# Patient Record
Sex: FEMALE | Race: OTHER | HISPANIC OR LATINO | URBAN - METROPOLITAN AREA
[De-identification: names, ages, dates, MRNs, and addresses within clinical notes are randomized per-mention and may not be internally consistent; named-entity substitution may affect disease eponyms.]

---

## 2017-06-27 ENCOUNTER — EMERGENCY (EMERGENCY)
Facility: HOSPITAL | Age: 67
LOS: 0 days | Discharge: HOME | End: 2017-06-27

## 2017-06-27 DIAGNOSIS — J45.909 UNSPECIFIED ASTHMA, UNCOMPLICATED: ICD-10-CM

## 2017-06-27 DIAGNOSIS — R11.2 NAUSEA WITH VOMITING, UNSPECIFIED: ICD-10-CM

## 2017-06-27 DIAGNOSIS — Z98.890 OTHER SPECIFIED POSTPROCEDURAL STATES: ICD-10-CM

## 2017-06-27 DIAGNOSIS — K57.90 DIVERTICULOSIS OF INTESTINE, PART UNSPECIFIED, WITHOUT PERFORATION OR ABSCESS WITHOUT BLEEDING: ICD-10-CM

## 2017-06-27 DIAGNOSIS — K57.92 DIVERTICULITIS OF INTESTINE, PART UNSPECIFIED, WITHOUT PERFORATION OR ABSCESS WITHOUT BLEEDING: ICD-10-CM

## 2017-06-27 DIAGNOSIS — R10.30 LOWER ABDOMINAL PAIN, UNSPECIFIED: ICD-10-CM

## 2017-06-27 DIAGNOSIS — Z79.899 OTHER LONG TERM (CURRENT) DRUG THERAPY: ICD-10-CM

## 2017-06-27 DIAGNOSIS — K57.32 DIVERTICULITIS OF LARGE INTESTINE WITHOUT PERFORATION OR ABSCESS WITHOUT BLEEDING: ICD-10-CM

## 2017-06-27 DIAGNOSIS — Z88.2 ALLERGY STATUS TO SULFONAMIDES: ICD-10-CM

## 2017-06-27 DIAGNOSIS — Z98.51 TUBAL LIGATION STATUS: ICD-10-CM

## 2018-05-31 ENCOUNTER — EMERGENCY (EMERGENCY)
Facility: HOSPITAL | Age: 68
LOS: 0 days | Discharge: HOME | End: 2018-05-31
Attending: EMERGENCY MEDICINE | Admitting: EMERGENCY MEDICINE

## 2018-05-31 VITALS
RESPIRATION RATE: 20 BRPM | HEART RATE: 76 BPM | OXYGEN SATURATION: 98 % | SYSTOLIC BLOOD PRESSURE: 185 MMHG | TEMPERATURE: 97 F | DIASTOLIC BLOOD PRESSURE: 86 MMHG

## 2018-05-31 DIAGNOSIS — T78.40XA ALLERGY, UNSPECIFIED, INITIAL ENCOUNTER: ICD-10-CM

## 2018-05-31 DIAGNOSIS — Z88.2 ALLERGY STATUS TO SULFONAMIDES: ICD-10-CM

## 2018-05-31 DIAGNOSIS — Z79.52 LONG TERM (CURRENT) USE OF SYSTEMIC STEROIDS: ICD-10-CM

## 2018-05-31 DIAGNOSIS — T37.0X5A ADVERSE EFFECT OF SULFONAMIDES, INITIAL ENCOUNTER: ICD-10-CM

## 2018-05-31 RX ORDER — FAMOTIDINE 10 MG/ML
20 INJECTION INTRAVENOUS ONCE
Qty: 0 | Refills: 0 | Status: COMPLETED | OUTPATIENT
Start: 2018-05-31 | End: 2018-05-31

## 2018-05-31 RX ADMIN — FAMOTIDINE 20 MILLIGRAM(S): 10 INJECTION INTRAVENOUS at 05:12

## 2018-05-31 RX ADMIN — Medication 40 MILLIGRAM(S): at 05:12

## 2018-05-31 NOTE — ED PROVIDER NOTE - PHYSICAL EXAMINATION
Vital signs reviewed  GENERAL: Patient well appearing, NAD  ENT: MMM. No tongue swelling.  RESPIRATORY: Normal respiratory effort. CTA B/L. No wheezing, rales, rhonchi. Speaking in full clear sentences.  CARDIOVASCULAR: Regular rate and rhythm. Normal S1/S2. No murmurs, rubs or gallops.  ABDOMEN: Soft. Nondistended. Nontender. No guarding or rebound.  SKIN:  Warm and dry. No acute rash.  PSYCHIATRIC: Cooperative. Affect appropriate.

## 2018-05-31 NOTE — ED PROVIDER NOTE - NS ED ROS FT
Constitutional: No fever  ENMT:  No tongue swelling  Respiratory:  No SOB  GI:  No nausea, vomiting, diarrhea, abdominal pain.  Skin:  +rash  Endocrine: No history of thyroid disease or diabetes.  Except as documented in the HPI,  all other systems are negative.

## 2018-05-31 NOTE — ED PROVIDER NOTE - ATTENDING CONTRIBUTION TO CARE
Patient here for now resolved rash after accidentally taking sulfa medication which she is allergic to, when she meant to take motrin. Took benadryl at home and rash resolved.     Never had angioedema, wheezing, nausea and continues to have no angioedema, clear lungs and soft abdomen.     No indication for further observation and treatment, advised to throw this medication away.     Will return for new or recurrent sx.

## 2018-05-31 NOTE — ED ADULT TRIAGE NOTE - CHIEF COMPLAINT QUOTE
Pt came c/o generalized rash and itchiness after accidently taking Sulfa pill instead of Motrin, pt has allergy to Sulfa. Pt took Benadryl at 3 am.

## 2018-05-31 NOTE — ED PROVIDER NOTE - OBJECTIVE STATEMENT
69yo F with h/o sulfa allergy p/w allergic reaction. Pt meant to take motrin but accidentally took sulfa medication prescribed to her 1 year ago. Took sulfa at 1am, began to have rash, took benadryl at 2am. Rash has since resolved. Denies SOB, tongue/lip swelling, nausea, vomiting.

## 2018-05-31 NOTE — ED ADULT NURSE NOTE - OBJECTIVE STATEMENT
pt sts she took sulfa medication by mistake and she developed a rash and took benadryl but came to the ED to be checked.

## 2018-06-23 ENCOUNTER — EMERGENCY (EMERGENCY)
Facility: HOSPITAL | Age: 68
LOS: 0 days | Discharge: HOME | End: 2018-06-24
Attending: EMERGENCY MEDICINE

## 2018-06-23 VITALS
RESPIRATION RATE: 20 BRPM | DIASTOLIC BLOOD PRESSURE: 79 MMHG | HEART RATE: 89 BPM | OXYGEN SATURATION: 98 % | TEMPERATURE: 97 F | SYSTOLIC BLOOD PRESSURE: 170 MMHG

## 2018-06-23 DIAGNOSIS — R51 HEADACHE: ICD-10-CM

## 2018-06-23 DIAGNOSIS — R20.0 ANESTHESIA OF SKIN: ICD-10-CM

## 2018-06-23 DIAGNOSIS — G45.9 TRANSIENT CEREBRAL ISCHEMIC ATTACK, UNSPECIFIED: ICD-10-CM

## 2018-06-23 DIAGNOSIS — R10.9 UNSPECIFIED ABDOMINAL PAIN: ICD-10-CM

## 2018-06-23 DIAGNOSIS — M54.9 DORSALGIA, UNSPECIFIED: ICD-10-CM

## 2018-06-23 LAB
ALBUMIN SERPL ELPH-MCNC: 3.9 G/DL — SIGNIFICANT CHANGE UP (ref 3.5–5.2)
ALP SERPL-CCNC: 62 U/L — SIGNIFICANT CHANGE UP (ref 30–115)
ALT FLD-CCNC: 11 U/L — SIGNIFICANT CHANGE UP (ref 0–41)
ANION GAP SERPL CALC-SCNC: 14 MMOL/L — SIGNIFICANT CHANGE UP (ref 7–14)
APPEARANCE UR: CLEAR — SIGNIFICANT CHANGE UP
AST SERPL-CCNC: 16 U/L — SIGNIFICANT CHANGE UP (ref 0–41)
BASOPHILS # BLD AUTO: 0.06 K/UL — SIGNIFICANT CHANGE UP (ref 0–0.2)
BASOPHILS NFR BLD AUTO: 0.7 % — SIGNIFICANT CHANGE UP (ref 0–1)
BILIRUB DIRECT SERPL-MCNC: <0.2 MG/DL — SIGNIFICANT CHANGE UP (ref 0–0.2)
BILIRUB INDIRECT FLD-MCNC: >0.1 MG/DL — LOW (ref 0.2–1.2)
BILIRUB SERPL-MCNC: 0.3 MG/DL — SIGNIFICANT CHANGE UP (ref 0.2–1.2)
BILIRUB UR-MCNC: NEGATIVE — SIGNIFICANT CHANGE UP
BUN SERPL-MCNC: 13 MG/DL — SIGNIFICANT CHANGE UP (ref 10–20)
CALCIUM SERPL-MCNC: 9.1 MG/DL — SIGNIFICANT CHANGE UP (ref 8.5–10.1)
CHLORIDE SERPL-SCNC: 103 MMOL/L — SIGNIFICANT CHANGE UP (ref 98–110)
CO2 SERPL-SCNC: 26 MMOL/L — SIGNIFICANT CHANGE UP (ref 17–32)
COLOR SPEC: YELLOW — SIGNIFICANT CHANGE UP
CREAT SERPL-MCNC: 0.8 MG/DL — SIGNIFICANT CHANGE UP (ref 0.7–1.5)
DIFF PNL FLD: NEGATIVE — SIGNIFICANT CHANGE UP
EOSINOPHIL # BLD AUTO: 0.15 K/UL — SIGNIFICANT CHANGE UP (ref 0–0.7)
EOSINOPHIL NFR BLD AUTO: 1.6 % — SIGNIFICANT CHANGE UP (ref 0–8)
ERYTHROCYTE [SEDIMENTATION RATE] IN BLOOD: 20 MM/HR — SIGNIFICANT CHANGE UP (ref 0–20)
GLUCOSE SERPL-MCNC: 111 MG/DL — HIGH (ref 70–99)
GLUCOSE UR QL: NEGATIVE MG/DL — SIGNIFICANT CHANGE UP
HCT VFR BLD CALC: 36.6 % — LOW (ref 37–47)
HGB BLD-MCNC: 11.4 G/DL — LOW (ref 12–16)
IMM GRANULOCYTES NFR BLD AUTO: 0.2 % — SIGNIFICANT CHANGE UP (ref 0.1–0.3)
KETONES UR-MCNC: NEGATIVE — SIGNIFICANT CHANGE UP
LEUKOCYTE ESTERASE UR-ACNC: NEGATIVE — SIGNIFICANT CHANGE UP
LIDOCAIN IGE QN: 28 U/L — SIGNIFICANT CHANGE UP (ref 7–60)
LYMPHOCYTES # BLD AUTO: 2.34 K/UL — SIGNIFICANT CHANGE UP (ref 1.2–3.4)
LYMPHOCYTES # BLD AUTO: 25.5 % — SIGNIFICANT CHANGE UP (ref 20.5–51.1)
MCHC RBC-ENTMCNC: 22.9 PG — LOW (ref 27–31)
MCHC RBC-ENTMCNC: 31.1 G/DL — LOW (ref 32–37)
MCV RBC AUTO: 73.5 FL — LOW (ref 81–99)
MONOCYTES # BLD AUTO: 0.72 K/UL — HIGH (ref 0.1–0.6)
MONOCYTES NFR BLD AUTO: 7.8 % — SIGNIFICANT CHANGE UP (ref 1.7–9.3)
NEUTROPHILS # BLD AUTO: 5.89 K/UL — SIGNIFICANT CHANGE UP (ref 1.4–6.5)
NEUTROPHILS NFR BLD AUTO: 64.2 % — SIGNIFICANT CHANGE UP (ref 42.2–75.2)
NITRITE UR-MCNC: NEGATIVE — SIGNIFICANT CHANGE UP
NRBC # BLD: 0 /100 WBCS — SIGNIFICANT CHANGE UP (ref 0–0)
PH UR: 6 — SIGNIFICANT CHANGE UP (ref 5–8)
PLATELET # BLD AUTO: 308 K/UL — SIGNIFICANT CHANGE UP (ref 130–400)
POTASSIUM SERPL-MCNC: 3.6 MMOL/L — SIGNIFICANT CHANGE UP (ref 3.5–5)
POTASSIUM SERPL-SCNC: 3.6 MMOL/L — SIGNIFICANT CHANGE UP (ref 3.5–5)
PROT SERPL-MCNC: 7.6 G/DL — SIGNIFICANT CHANGE UP (ref 6–8)
PROT UR-MCNC: (no result) MG/DL
RBC # BLD: 4.98 M/UL — SIGNIFICANT CHANGE UP (ref 4.2–5.4)
RBC # FLD: 15.8 % — HIGH (ref 11.5–14.5)
SODIUM SERPL-SCNC: 143 MMOL/L — SIGNIFICANT CHANGE UP (ref 135–146)
SP GR SPEC: 1.01 — SIGNIFICANT CHANGE UP (ref 1.01–1.03)
TROPONIN T SERPL-MCNC: <0.01 NG/ML — SIGNIFICANT CHANGE UP
UROBILINOGEN FLD QL: 0.2 MG/DL — SIGNIFICANT CHANGE UP (ref 0.2–0.2)
WBC # BLD: 9.18 K/UL — SIGNIFICANT CHANGE UP (ref 4.8–10.8)
WBC # FLD AUTO: 9.18 K/UL — SIGNIFICANT CHANGE UP (ref 4.8–10.8)

## 2018-06-23 RX ORDER — MECLIZINE HCL 12.5 MG
1 TABLET ORAL
Qty: 0 | Refills: 0 | COMMUNITY

## 2018-06-23 RX ORDER — ALBUTEROL 90 UG/1
2 AEROSOL, METERED ORAL
Qty: 0 | Refills: 0 | COMMUNITY

## 2018-06-23 NOTE — CONSULT NOTE ADULT - SUBJECTIVE AND OBJECTIVE BOX
CC: Headache      HPI: 69 yo right handed female with h/o vertigo , asthma and chronic headaches which is well controlled by OTC pain medications p.w 4 day h/o bifrontal pulsating headache and left facial numbness. Patient states that the symptoms started Wednesday and was a/w left neck pain and numbness of the V3 division of the left face. Reports that headache was a/w with visual floaters and was relieved with OTC pain meds. She got concerned when the facial numbness started and came to ed to get evaluated. Denies nausea, vomiting, dizziness, speech deficits, vision loss, focal weakness.    Home medications  -meclizine 12.5 mg q hs  -Atrovent inhaler    Social history  -denies smoking alcohol or drugs    Family history  -denies significant family history      Neuro Exam:  Orientation: oriented to person, place time and situation, speech and language intact  Cranial Nerves:  visual fields full to confrontation, pupils equal round and reactive to light, extraocular motion intact, decreased sensation to pp on the v3 division of the left face, face symmetrical, hearing was intact bilaterally, tongue and palate midline, head turning and shoulder shrug symmetric and there was no tongue deviation with protrusion.   Motor: 5/5 throughout/ no drift             Normal muscle tone and bulk             No abnormal involuntary movements  Sensory exam: intact except decreased pp to the v3 division of the left face.  Coordination:. no dysmetria or limb ataxia  Deep tendon reflexes: 2+/4  Plantar responses normal on the right, normal on the left.      NIHSS: 1    Allergies    sulfa drugs (Hives)    Intolerances      MEDICATIONS  (STANDING):    MEDICATIONS  (PRN):      LABS:                        11.4   9.18  )-----------( 308      ( 23 Jun 2018 19:02 )             36.6     06-23    143  |  103  |  13  ----------------------------<  111<H>  3.6   |  26  |  0.8    Ca    9.1      23 Jun 2018 19:02    TPro  7.6  /  Alb  3.9  /  TBili  0.3  /  DBili  <0.2  /  AST  16  /  ALT  11  /  AlkPhos  62  06-23            Neuro Imaging:  < from: CT Head No Cont (06.23.18 @ 18:29) >  findings:    The ventricular system, basal cisterns and cortical sulcal pattern are   within normal limits for the patient's stated age.    There is no acute intracranial hemorrhage, mass-effect or midline shift.    There is no extra-axial collection.    The visualized paranasal sinuses and mastoid complexes are grossly   unremarkable.  There is no displaced skull fracture.      Impression:    No CT evidence of acute intracranial injury.    If there is high clinical suspicion for an acute infarct, consider MRI   with stroke protocol.        < end of copied text >      EEG:     Echo:   Carotid Doppler: N/A  Telemetry: CC: Headache    HPI: 69 yo right handed female with h/o vertigo , asthma and chronic headaches which is well controlled by OTC pain medications p.w 4 day h/o bifrontal pulsating headache and left facial numbness. Patient states that the symptoms started Wednesday and was a/w left neck pain and numbness of the V3 division of the left face. Reports that headache was a/w with visual floaters and was relieved with OTC pain meds. She got concerned when the facial numbness started and came to ed to get evaluated. Denies nausea, vomiting, dizziness, speech deficits, vision loss, focal weakness.    Home medications  -meclizine 12.5 mg q hs  -Atrovent inhaler    Social history  -denies smoking alcohol or drugs    Family history  -denies significant family history      Neuro Exam:  Orientation: oriented to person, place time and situation, speech and language intact  Cranial Nerves:  visual fields full to confrontation, pupils equal round and reactive to light, extraocular motion intact, decreased sensation to pp on the v3 division of the left face, face symmetrical, hearing was intact bilaterally, tongue and palate midline, head turning and shoulder shrug symmetric and there was no tongue deviation with protrusion.   Motor: 5/5 throughout/ no drift             Normal muscle tone and bulk             No abnormal involuntary movements  Sensory exam: intact except decreased pp to the v3 division of the left face.  Coordination:. no dysmetria or limb ataxia  Deep tendon reflexes: 2+/4  Plantar responses normal on the right, normal on the left.      NIHSS: 1    Allergies    sulfa drugs (Hives)    Intolerances      MEDICATIONS  (STANDING):    MEDICATIONS  (PRN):      LABS:                        11.4   9.18  )-----------( 308      ( 23 Jun 2018 19:02 )             36.6     06-23    143  |  103  |  13  ----------------------------<  111<H>  3.6   |  26  |  0.8    Ca    9.1      23 Jun 2018 19:02    TPro  7.6  /  Alb  3.9  /  TBili  0.3  /  DBili  <0.2  /  AST  16  /  ALT  11  /  AlkPhos  62  06-23            Neuro Imaging:  < from: CT Head No Cont (06.23.18 @ 18:29) >  findings:    The ventricular system, basal cisterns and cortical sulcal pattern are   within normal limits for the patient's stated age.    There is no acute intracranial hemorrhage, mass-effect or midline shift.    There is no extra-axial collection.    The visualized paranasal sinuses and mastoid complexes are grossly   unremarkable.  There is no displaced skull fracture.      Impression:    No CT evidence of acute intracranial injury.    If there is high clinical suspicion for an acute infarct, consider MRI   with stroke protocol.        < end of copied text >      EEG:     Echo:   Carotid Doppler: N/A  Telemetry:

## 2018-06-23 NOTE — ED PROVIDER NOTE - OBJECTIVE STATEMENT
patient states that 4 days ago started having intermittent episodes of headache, moderate pain, associated with Lt sided neck pain and Lt lower face numbness, also had discomfort in LUE/LLE, patient states that had one episode of diarrhea with abd pain 4 days ago, which had resolved. Denies any cp/sob/palpitations, denies any trauma, no f/c/rash. Denies any eye pain/vision changes.   patient denies any headache in the ED, patient is c/o Lt lower facial numbness only. Denies any trauma, no head/neck injury.

## 2018-06-23 NOTE — ED CDU PROVIDER INITIAL DAY NOTE - NEURO NEGATIVE STATEMENT, MLM
no loss of consciousness, no gait abnormality, + frontal headache, no sensory deficits, and no weakness.

## 2018-06-23 NOTE — ED ADULT NURSE NOTE - OBJECTIVE STATEMENT
patient presents to ED for evaluation of intermittent generalized abdominal pain which began on Thursday with one episode of diarrhea.

## 2018-06-23 NOTE — ED CDU PROVIDER INITIAL DAY NOTE - ENMT NEGATIVE STATEMENT, MLM
Ears: no ear pain and no hearing problems.Nose: no nasal congestion and no nasal drainage.Mouth/Throat: no dysphagia, no hoarseness and no throat pain.Neck: no lumps, + left sided neck pain, no stiffness and no swollen glands

## 2018-06-23 NOTE — ED CDU PROVIDER INITIAL DAY NOTE - OBJECTIVE STATEMENT
69y/o female with pmh of asthma and vertigo, pt. c/o lower back pain which radiated to left leg. pt. also c/o frontal ha with left neck pain x 3 days. pt. has a hx of headaches but was never evaluated for it. pt. states that this ha is no different from previous episodes. malone is usually relieved with tylenol or motrin. denies slurred speech, cp, sob, numbness, weakness. pt. was placed into obs for tia workup.

## 2018-06-23 NOTE — ED CDU PROVIDER INITIAL DAY NOTE - ATTENDING CONTRIBUTION TO CARE
Pt presents for upper abdominal pain and left back pain. Pain radiates down the left leg. Also pain to the left side of the neck. No nausea or vomiting. Pt also notes she had brief numbness to the corner of the mouth on the left side. Hx of chronic headaches and chronic vertigo. Takes antivert daily at night. Takes advil and tylenol daily. On exam S1S2 rrr, lungs clear, abdomen-+ epigastric tenderness. + left flank area tenderness. no rash. Normal neuro. Will do Ct scan abdomen and pelvis.

## 2018-06-23 NOTE — CONSULT NOTE ADULT - ASSESSMENT
67 yo right handed female with h/o vertigo , asthma and chronic headaches which is well controlled by OTC pain medications p. w 4 day h/o bifrontal pulsating headache , left neck pain and  left facial numbness. Nihss 1    DDx:  cva/tia vs Migraines    Plan  -n/c mri brain  -mra h/n  -echo  -start asa 81mg  -check lipid profile and hbaic  -admit to tia obs unit 69 yo right handed female with h/o vertigo , asthma and chronic headaches which is well controlled by OTC pain medications p. w 4 day h/o bifrontal pulsating headache , left neck pain and  left facial numbness. Nihss 1    DDx:  cva/tia vs Migraines    Plan  -n/c mri brain  -mra h/n  -echo  -start asa 81mg  -check lipid profile and hbaic  -admit to tia obs unit  - if MRI/A (-), may d/c with outpt f/u

## 2018-06-23 NOTE — ED PROVIDER NOTE - PROGRESS NOTE DETAILS
neuro NP in ED evaluating patient. patient remained stable in ED, as recommended by neuro, is admitted to EDOU for TIA evaluation.

## 2018-06-24 VITALS
HEART RATE: 64 BPM | DIASTOLIC BLOOD PRESSURE: 70 MMHG | RESPIRATION RATE: 18 BRPM | TEMPERATURE: 98 F | SYSTOLIC BLOOD PRESSURE: 130 MMHG | OXYGEN SATURATION: 97 %

## 2018-06-24 NOTE — ED ADULT NURSE REASSESSMENT NOTE - NS ED NURSE REASSESS COMMENT FT1
Pt is resting with even and unlabored respirations, continuous cardiac monitoring in place, no  immediate concerns noted at this time, will continue to monitor and assess.

## 2018-06-24 NOTE — ED CDU PROVIDER DISPOSITION NOTE - CLINICAL COURSE
Pt presented with abdominal pain, back pain, headache and some facial numbness. Placed into observation for evaluation. While in observation pt was on continuous cardiac monitoring. Serial cardiac markers neg. Echo LVH. Ct scan neg except for tiny density of liver. Pt was advised. Glucose slightly elevated. Pt advised and told to lose 5 lbs. PT is leaving ama. Advised to follow up with her doctor, neuro and cardiology.

## 2018-06-24 NOTE — ED ADULT NURSE REASSESSMENT NOTE - NS ED NURSE REASSESS COMMENT FT1
Pt on continuous cardiac and pulse ox monitoring,  at this present time there are no complaints, will continue to monitor and assess.

## 2020-10-30 ENCOUNTER — EMERGENCY (EMERGENCY)
Facility: HOSPITAL | Age: 70
LOS: 0 days | Discharge: HOME | End: 2020-10-30
Attending: EMERGENCY MEDICINE | Admitting: EMERGENCY MEDICINE
Payer: COMMERCIAL

## 2020-10-30 VITALS
TEMPERATURE: 98 F | HEART RATE: 65 BPM | DIASTOLIC BLOOD PRESSURE: 85 MMHG | RESPIRATION RATE: 18 BRPM | SYSTOLIC BLOOD PRESSURE: 192 MMHG | OXYGEN SATURATION: 98 %

## 2020-10-30 DIAGNOSIS — M79.669 PAIN IN UNSPECIFIED LOWER LEG: ICD-10-CM

## 2020-10-30 DIAGNOSIS — M54.42 LUMBAGO WITH SCIATICA, LEFT SIDE: ICD-10-CM

## 2020-10-30 DIAGNOSIS — Z88.2 ALLERGY STATUS TO SULFONAMIDES: ICD-10-CM

## 2020-10-30 PROCEDURE — 93971 EXTREMITY STUDY: CPT | Mod: 26,LT

## 2020-10-30 PROCEDURE — 99284 EMERGENCY DEPT VISIT MOD MDM: CPT

## 2020-10-30 RX ORDER — IBUPROFEN 200 MG
1 TABLET ORAL
Qty: 21 | Refills: 0
Start: 2020-10-30 | End: 2020-11-05

## 2020-10-30 RX ORDER — METHOCARBAMOL 500 MG/1
1000 TABLET, FILM COATED ORAL ONCE
Refills: 0 | Status: COMPLETED | OUTPATIENT
Start: 2020-10-30 | End: 2020-10-30

## 2020-10-30 RX ORDER — IBUPROFEN 200 MG
600 TABLET ORAL ONCE
Refills: 0 | Status: COMPLETED | OUTPATIENT
Start: 2020-10-30 | End: 2020-10-30

## 2020-10-30 RX ORDER — METHOCARBAMOL 500 MG/1
2 TABLET, FILM COATED ORAL
Qty: 30 | Refills: 0
Start: 2020-10-30 | End: 2020-11-03

## 2020-10-30 RX ADMIN — METHOCARBAMOL 1000 MILLIGRAM(S): 500 TABLET, FILM COATED ORAL at 21:29

## 2020-10-30 RX ADMIN — Medication 600 MILLIGRAM(S): at 21:29

## 2020-10-30 NOTE — ED PROVIDER NOTE - PATIENT PORTAL LINK FT
You can access the FollowMyHealth Patient Portal offered by [facility] by registering at the following website: [email or web address]. By joining [facility]’s FollowMyHealth portal, you will also be able to view your health information using other applications (apps) compatible with our system.

## 2020-10-30 NOTE — ED PROVIDER NOTE - NS ED ROS FT
Constitutional: No fever, chills.  Eyes:  No visual changes  ENMT:  No neck pain  Cardiac:  No chest pain  Respiratory:  No cough, SOB  GI:  No nausea, vomiting, diarrhea, abdominal pain.  :  No dysuria, hematuria  MS:  (+)lower back pain. (+)left lower extremity pain  Neuro:  No headache or lightheadedness  Skin:  No skin rash  Endocrine: No history of thyroid disease or diabetes.  Except as documented in the HPI,  all other systems are negative.

## 2020-10-30 NOTE — ED PROVIDER NOTE - NSFOLLOWUPINSTRUCTIONS_ED_ALL_ED_FT
Sciatica    WHAT YOU NEED TO KNOW:    Sciatica is a condition that causes pain along your sciatic nerve. The sciatic nerve runs from your spine through both sides of your buttocks. It then runs down the back of your thigh, into your lower leg and foot. Your sciatic nerve may be compressed, inflamed, irritated, or stretched.          DISCHARGE INSTRUCTIONS:    Medicines:     NSAIDs: These medicines decrease swelling and pain. NSAIDs are available without a doctor's order. Ask your healthcare provider which medicine is right for you. Ask how much to take and when to take it. Take as directed. NSAIDs can cause stomach bleeding or kidney problems if not taken correctly.      Acetaminophen: This medicine decreases pain. Acetaminophen is available without a doctor's order. Ask how much to take and when to take it. Follow directions. Acetaminophen can cause liver damage if not taken correctly.      Muscle relaxers help decrease pain and muscle spasms.      Take your medicine as directed. Contact your healthcare provider if you think your medicine is not helping or if you have side effects. Tell him of her if you are allergic to any medicine. Keep a list of the medicines, vitamins, and herbs you take. Include the amounts, and when and why you take them. Bring the list or the pill bottles to follow-up visits. Carry your medicine list with you in case of an emergency.    Follow up with your healthcare provider as directed: Write down your questions so you remember to ask them during your visits.     Manage your symptoms:     Activity: Decrease your activity. Do not lift heavy objects or twist your back for at least 6 weeks. Slowly return to your usual activity.      Ice: Ice helps decrease swelling and pain. Ice may also help prevent tissue damage. Use an ice pack, or put crushed ice in a plastic bag. Cover it with a towel and place it on your low back or leg for 15 to 20 minutes every hour or as directed.      Heat: Heat helps decrease pain and muscle spasms. Apply heat on the area for 20 to 30 minutes every 2 hours for as many days as directed.       Physical therapy: You may need to see physical therapist to teach you exercises to help improve movement and strength, and to decrease pain. An occupational therapist teaches you skills to help with your daily activities.       Use assistive devices if directed: You may need to wear back support, such as a back brace. You may need crutches, a cane, or a walker to decrease stress on your lower back and leg muscles. Ask your healthcare provider for more information about assistive devices and how to use them correctly.    Self-care:     Avoid pressure on your back and legs: Do not lift heavy objects, or stand or sit for long periods of time.      Lift objects safely: Keep your back straight and bend your knees when you  an object. Do not bend or twist your back when you lift.      Maintain a healthy weight: Ask your healthcare provider how much you should weigh. Ask him to help you create a weight loss plan if you are overweight.       Exercise: Ask your healthcare provider about the best stretching, warmup, and exercise plan for you.     Contact your healthcare provider if:     You have pain in your lower back at night or when resting.      You have pain in your lower back with numbness below the knee.      You have weakness in one leg only.      You have questions or concerns about your condition or care.    Return to the emergency department if:     You have trouble holding back your urine or bowel movements.      You have weakness in both legs.      You have numbness in your groin or buttocks.         © Copyright [employer] 2019 All illustrations and images included in CareNotes are the copyrighted property of [employer] or [employer].

## 2020-10-30 NOTE — ED PROVIDER NOTE - PROGRESS NOTE DETAILS
I have personally evaluated the patient myself and agree with fellow's plan. Patient appears very well, states she is feeling better, tingling has almost resolved. as per vascular tech, prelim duplex study is negative Patient to be discharged from ED. Any available test results were discussed with patient and/or family. Verbal instructions given, including instructions to return to ED immediately for any new, worsening, or concerning symptoms. Patient endorsed understanding. Written discharge instructions additionally given, including follow-up plan.  Patient was given opportunity to ask questions.

## 2020-10-30 NOTE — ED PROVIDER NOTE - OBJECTIVE STATEMENT
69 yo F pmh sciatica, vertigo, asthma and diverticulitis sent in by [facility] to rule out DVT, c/o left sided lower extremity moderate, throbbing pain associated with numbness and tingling x4days. Pt reports the left lower extremity pain started at her toes and radiates up to the left lower back, worse with walking, better while sitting. Pt denies smoking, hx of DVT/PE. Pt able to ambulate unassisted. Pt denies any trauma to the area, fever, SOB, chest pain, abdominal pain, chills, urinary/bowel incontinence.

## 2020-10-30 NOTE — ED PROVIDER NOTE - ATTENDING CONTRIBUTION TO CARE
71 yo female PMH as noted c/.o non-traumatic left buttock pain radiating to her leg/foot for over a week. Pain is associated with mild tingling in her leg which is different from prior episode of sciatica on the same side,  She has been taking Motrin last week, but this week she only took 1 tab yesterday.  Denies any weakness, urinary or bowel problems, no abdominal pain, or any other complaints , no additional neurologic symptoms.  She denies any weight loss.  Her PMD is on vacations so she went to urgent care and was sent here.  She has an appointment with Dr Leggett on Tuesday.  Well-appearing elderly female, NAD, sitting on a chair, PERRL, nml work of breathing, lungs CTA b/l RRR., well-perfused extremities, distal pulses intact, abdomen soft, NT/ND, no midline spine ttp, + ttp of the left buttock., no calf ttp or unilateral leg swelling, FROM at all joints, nml dorsiflexion of the great toes B/l , no focal motor weakness, slightly decreased sensation to light touch of the left foot, gait slightly antalgic.  Imp: left sciatica, will treat pain, patient already has appropriate follow up.

## 2020-10-30 NOTE — ED PROVIDER NOTE - PHYSICAL EXAMINATION
CONST: Well appearing in NAD  NECK: Non-tender, no meningeal signs  CARD: Normal S1 S2; Normal rate and rhythm  RESP: Equal BS B/L, No wheezes, rhonchi or rales. No distress  GI: Soft, non-tender, non-distended.  MS: (+)bilateral paraspinal lumber tenderness, left sciatic notch tenderness. Normal ROM in all extremities. No midline spinal tenderness.  SKIN: Warm, dry, no acute rashes. Good turgor  NEURO: A&Ox3, No focal deficits. Strength 5/5 with no sensory deficits. Steady gait

## 2020-10-30 NOTE — ED PROVIDER NOTE - CLINICAL SUMMARY MEDICAL DECISION MAKING FREE TEXT BOX
Patient with left sciatica, appears very well, symptoms improved in ED, no red flags, has a follow up appointment with her PMD in 3 days, strict return precautions given.

## 2020-10-31 PROBLEM — K57.92 DIVERTICULITIS OF INTESTINE, PART UNSPECIFIED, WITHOUT PERFORATION OR ABSCESS WITHOUT BLEEDING: Chronic | Status: ACTIVE | Noted: 2018-06-23

## 2020-10-31 PROBLEM — R42 DIZZINESS AND GIDDINESS: Chronic | Status: ACTIVE | Noted: 2018-06-23

## 2021-03-09 ENCOUNTER — EMERGENCY (EMERGENCY)
Facility: HOSPITAL | Age: 71
LOS: 0 days | Discharge: HOME | End: 2021-03-09
Attending: EMERGENCY MEDICINE | Admitting: EMERGENCY MEDICINE
Payer: COMMERCIAL

## 2021-03-09 VITALS
HEART RATE: 85 BPM | TEMPERATURE: 100 F | DIASTOLIC BLOOD PRESSURE: 72 MMHG | SYSTOLIC BLOOD PRESSURE: 156 MMHG | RESPIRATION RATE: 18 BRPM | OXYGEN SATURATION: 100 %

## 2021-03-09 DIAGNOSIS — Z79.51 LONG TERM (CURRENT) USE OF INHALED STEROIDS: ICD-10-CM

## 2021-03-09 DIAGNOSIS — U07.1 COVID-19: ICD-10-CM

## 2021-03-09 DIAGNOSIS — Z88.2 ALLERGY STATUS TO SULFONAMIDES: ICD-10-CM

## 2021-03-09 DIAGNOSIS — R51.9 HEADACHE, UNSPECIFIED: ICD-10-CM

## 2021-03-09 DIAGNOSIS — J45.909 UNSPECIFIED ASTHMA, UNCOMPLICATED: ICD-10-CM

## 2021-03-09 PROCEDURE — 99284 EMERGENCY DEPT VISIT MOD MDM: CPT

## 2021-03-09 NOTE — ED ADULT NURSE NOTE - NS ED NURSE LEVEL OF CONSCIOUSNESS SPEECH
Patient:   TRAMAINE CABELLO            MRN: [id number]            FIN: [id number]              Age:   62 years     Sex:  FEMALE     :  [birth year]   Associated Diagnoses:   None   Author:   NEETU NICKERSON     Subjective   on milrinone  restarted on lasix drip     Objective   Intake and Output   I & O between:  2019 12:47 TO 2019 12:47  Med Dosing Weight:  83  kg   15-JUL-2019  24 Hour Intake:   5350.32  ( 64.46 mL/kg )  24 Hour Output:   3265.00           24 Hour Urine/Stool Output:   0.0  24 Hour Balance:   .32           24 Hour Urine Output:   3040.00  ( 1.53 mL/kg/hr )                    Stool Count:  1         Vitals between:   2019 12:47:31   TO   2019 12:47:31                   LAST RESULT MINIMUM MAXIMUM  Heart Rate 86 76 93  Respiratory Rate 17 15 32  A Line Systolic 77 70 102  A Line Diastolic 62 62 98  A Line Mean 66 66 95  CVP                     10 1 12  SpO2                    100 97 100  FiO2                    0.40 0.40 0.50      General:  No acute distress, intubated, sedated.    HENT:  Normocephalic, Oral mucosa is moist.    Respiratory:  coarse bs.    Cardiovascular:  pump sounds.    Gastrointestinal:  Soft, Non-distended.    Genitourinary:  sinclair.    Lymphatics:  trace LE edema.    Integumentary:  Warm, Dry.    Neurologic:  sedated.    Psychiatric:  sedated.      Results Review   Labs between:  2019 12:47 to 2019 12:47  CBC:                 WBC  HgB  Hct  Plt  MCV  RDW   2019 (H) 11.1  (L) 7.8  (L) 22.7  (L) 71  86.3  (H) 17.3   2019 (H) 11.5  (L) 8.5  (L) 25.2  (L) 68  87.2  (H) 17.3   2019 (H) 12.0  (L) 8.5  (L) 25.1  (L) 63  86.6  (H) 17.1   2019   (L) 8.5  (L) 25.2         DIFF:                 Seg  Neutroph//ABS  Lymph//ABS  Mono//ABS  EOS/ABS  2019 NOT APPLICABLE  89 // (H) 10.2  4 // (L) 0.5  4 // 0.5 2 // 0.2  BMP:                 Na  Cl  BUN  Glu   2019                                     K  CO2   Cr  Ca                              3.4         BMP:                 Na  Cl  BUN  Glu   28-JUL-2019 140  (H) 113  (H) 69  (H) 127                              K  CO2  Cr  Ca                              4.1  (L) 18  0.92  (L) 7.7   BMP:                 Na  Cl  BUN  Glu   27-JUL-2019                                     K  CO2  Cr  Ca                              (L) 3.2         BMP:                 Na  Cl  BUN  Glu   27-JUL-2019 144  (H) 117  (H) 67  (H) 111                              K  CO2  Cr  Ca                              4.1  (L) 20  0.93  (L) 7.5   CMP:                 AST  ALT  AlkPhos  Bili  Albumin   28-JUL-2019 (H) 50  37  85  (H) 2.8  (L) 2.5   Other Chem:             Mg  Phos  Triglycerides  GGTP  DirectBili                           1.8  2.9         POC GLU:                 Latest Result  Latest Date  Minimum  Min Date  Maximum  Max Date                             (H) 112  27-JUL-2019 (H) 112  27-JUL-2019 (H) 110  27-JUL-2019  COAG:                 INR  PT  PTT  Ddimer  Fibrinogen    28-JUL-2019 1.0  11.0  (H) 33       Blood Gas:            Ph  PCO2  PO2  BiCarb  BaseExcess   Arterial:  28-JUL-2019 7.40  32  (H) 224  (L) 20  NOT APPLICABLE                              Ionized Ca  Na  K  HgB  Lactic Acid                              1.24  138  (L) 3.3  (L) 8.9  0.8   28-JUL-2019 7.35  (L) 31  (H) 230  (L) 17  NOT APPLICABLE                              Ionized Ca  Na  K  HgB  Lactic Acid                              1.23  136  3.8  (L) 8.0  0.7   28-JUL-2019 7.36  33  (H) 232  (L) 19  NOT APPLICABLE                              Ionized Ca  Na  K  HgB  Lactic Acid                              1.22  136  3.4  (L) 7.7  0.8   27-JUL-2019 7.36  34  (H) 276  (L) 19  NOT APPLICABLE                              Ionized Ca  Na  K  HgB  Lactic Acid                              1.24  137  3.7  (L) 9.4  0.7   27-JUL-2019 7.38  (L) 31  (H) 279  (L) 18  NOT APPLICABLE                              Ionized Ca  Na  K   HgB  Lactic Acid                              1.19  137  4.0  (L) 10.9  0.7                  Result title:  XR CHEST 1V  Result status:  Final  Verified by:  CORNEL NEWTON on 07/28/2019 5:29  IMPRESSION:Stable exam.No acute findings.  Result title:  XR ABDOMEN 1V  Result status:  Final  Verified by:  CORNEL NEWTON on 07/28/2019 5:30  IMPRESSION: There is a nonspecific bowel gas pattern.  No free air or obstruction is seen.  No mass effect or suggestion of ascites. NG tube in stomach1.       Impression and Plan   1. INES: baseline cr not known at this time  INES secondary to ATN.   - CRRT started 7/19.  CRRT stopped 7/22  - BUN elevated due to diuresis and GI bleeding  - cr is good  2. cv shock post NSTEMI with multivessel dz.   complicated by mediastinal bleeding, emergent anterior thoracotomy  - restarted on lasix drip.  monitor CVP  3. Resp failure. reintubated.   4. hypokalemia: replace as needed  5. hypernatremia. na improving  - stop D5w  - decrease water flushes to 250ml q6 hours  6. anemia/ GI bleeding: colonoscopy with ischemic colitis  - transfuse as needed  - GI following  discussed with ICU RN   Speaking Coherently/Age appropriate

## 2021-03-09 NOTE — ED ADULT NURSE NOTE - OBJECTIVE STATEMENT
pt presents to ED requesting antibody infusion, c.o headache. Pt tested positive for covid x 1 week ago

## 2021-03-09 NOTE — ED PROVIDER NOTE - NS ED ROS FT
Constitutional: (+) fever, (+) fatigue  Eyes/ENT: (-) blurry vision, (-) epistaxis  Cardiovascular: (-) chest pain, (-) syncope  Respiratory: (-) cough, (-) shortness of breath  Gastrointestinal: (-) vomiting, (-) diarrhea  Musculoskeletal: (-) neck pain, (-) back pain, (-) joint pain  Integumentary: (-) rash, (-) edema  Neurological: (-) headache, (-) altered mental status  Psychiatric: (-) hallucinations  Allergic/Immunologic: (-) pruritus

## 2021-03-09 NOTE — ED PROVIDER NOTE - NSFOLLOWUPINSTRUCTIONS_ED_ALL_ED_FT
Follow up with PMD in 1-2 days.    Novel Coronavirus (COVID-19)  The Facts  What is a coronavirus?  Coronaviruses are a large family of viruses that cause illnesses ranging from the common cold  to more severe diseases such as Middle East Respiratory Syndrome (MERS) and Severe Acute  Respiratory Syndrome (SARS).  What is Novel Coronavirus (COVID-19)?  COVID-19 is a new strain of Coronavirus that has not been previously identified in humans. COVID-19  was identified in [place], [place], [place] in December 2019 (COVID-19). COVID-19 has  since been identified outside of [place], in a growing number of countries internationally, including  the [place].  Where can I find the most recent information about COVID-19?  The Centers for Disease Control and Prevention (CDC) is closely monitoring the outbreak caused by the  COVID-19. For the latest information about COVID- 19, visit the CDC website at  https://www.cdc.gov/coronavirus/index.html  How are coronaviruses spread?  Coronaviruses can be transmitted from person-to- person, usually after close contact with an infected person,  for example, in a household, workplace, or healthcare setting via droplets that become airborne after a cough  or sneeze by an affected person. These droplets can then infect a nearby person. It is likely transmission also  occurs by touching recently contaminated surfaces.  What are the symptoms of coronavirus infection?  It depends on the virus, but common signs include fever and/or respiratory symptoms such as  cough and shortness of breath. In more severe cases, infection can cause pneumonia, severe acute  respiratory syndrome, kidney failure and even death. Fortunately, most cases of COVID-19 have an  illness no different than the influenza “flu”. With a majority of these patients having mild symptoms  and overall mortality which appears to be not much different than the flu.  Is there a treatment for a COVID-19?  There is no specific treatment for disease caused by COVID-19. However, many of the symptoms can  be treated based on the patient’s clinical condition. Supportive care for infected persons can be highly  effective.  What can I do to protect myself?  Washing your hands, covering your cough, and disinfecting surfaces are the best precautionary  measures. It is also advisable to avoid close contact with anyone showing symptoms of respiratory  illness such as coughing and sneezing. Those with symptoms should wear a surgical mask when  around others.  What can I do to protect those around me?  If you have been identified as someone who may be infected with COVID-19, we recommend you  follow the self-isolation procedures outlined below to protect those around you and limit the spread  of this virus.   March 3, 2020  Recommendations for Patients Advised to Self-Isolate  for Possible COVID-19 Exposure  We recommend the below precautionary steps from now until 14 days from when you  returned from your travel or date of your last known possible contact:  - Do not go to work, school, or public areas. Avoid using public transportation, ride-sharing, or  taxis.  - As much as possible, separate yourself from other people in your home. If you can, you should  stay in a room and away from other people in your home. Also, you should use a separate  bathroom, if available.  - Wear the supplied mask whenever you are around other people.  - If you have a non-urgent medical appointment, please reschedule for a later date. If the  appointment is urgent, please call the healthcare provider and tell them that you are on selfisolation for possible COVID-19. This will help the healthcare provider’s office take steps to keep  other people from getting infected or exposed. If you can reschedule routine appointments, do  so.  - Wash your hands often with soap and water for at least 15 to 20 seconds or clean your hands  with an alcohol-based hand  that contains 60 to 95% alcohol, covering all surfaces of  your hands and rubbing them together until they feel dry. Soap and water should be used  preferentially if hands are visibly dirty.  - Cover your mouth and nose with a tissue when you cough or sneeze. Throw used tissues in a  lined trash can; immediately wash your hands.  - Avoid touching your eyes, nose, and mouth with your hands.  - Avoid sharing personal household items. You should not share dishes, drinking glasses, cups,  eating utensils, towels, or bedding with other people or pets in your home. After using these  items, they should be washed thoroughly with soap and water.  - Clean and disinfect all “high-touch” surfaces every day. High touch surfaces include counters,  tabletops, doorknobs, light switches, remote controls, bathroom fixtures, toilets, phones,  keyboards, tablets, and bedside tables. Also, clean any surfaces that may have blood, stool, or  body fluids on them.       If you develop worsening symptoms:  - If you develop worsening symptoms, such as severe shortness of breath, please call [phone] option #9. They will assist you in determining your next steps.  During your time on self-isolation do the following:  - Work from home if you are able to so.  - Limit social isolation by talking with friends and family on the phone or with face-time  - Talk with friends and relatives who don’t live with you about supporting each other if one  household has to be quarantined. For example, agree to drop groceries or other supplies at the  front door.  - Exercise and spend time outdoors away from others if able to do so.    Why didn’t I get tested for novel coronavirus (COVID-19)?  The number of available tests is very limited so strict rules exist for who is allowed to be tested.  [facility] has been authorized to perform testing and is currently working hard to be  able to start providing the test. Such testing is currently reserved for patients who have had  contact with someone infected with the virus, or those who are very sick a plus those who have  traveled to areas identified by the Centers for Disease Control and Prevention (CD) and will  require hospitalization.  What should I do now?  If you are well enough to be discharged home and are not in a high risk group to have  contracted the COVID-10, you should care for yourself at home exactly like you would if you  have Influenza “flu”. Follow all the standard guidelines about washing your hands, covering  your cough, etc.  You should return to the Emergency Department if you develop worse symptoms, trouble  breathing, chest pain, and/or a fever that doesn’t improve with over the counter  acetaminophen or ibuprofen.

## 2021-03-09 NOTE — ED PROVIDER NOTE - OBJECTIVE STATEMENT
71y F pmh asthma, diverticulitis, vertigo, COVID (+) presents for antibody infusion. Pt was sent from [facility] for antibody infusion evaluation. Pt has COVID x1wk with associated fatigue and fevers, relieved with motrin and tylenol, no aggravating factors. Denies ha, cp, sob, cough, weakness, numbness, dysuria, hematuria, n/v/d/c

## 2021-03-09 NOTE — ED PROVIDER NOTE - PATIENT PORTAL LINK FT
You can access the FollowMyHealth Patient Portal offered by [facility] by registering at the following website: [email or web address]. By joining [employer]’s FollowMyHealth portal, you will also be able to view your health information using other applications (apps) compatible with our system.

## 2021-03-09 NOTE — ED PROVIDER NOTE - CLINICAL SUMMARY MEDICAL DECISION MAKING FREE TEXT BOX
70 y/o F p/w positive COVID test, myalgias, HA, and cough. Denies SOB. Physical-vss,nad,perrl,mmm,rrr,ctab,abd softntnd,fromx4, anox3. Pt was referred as a candidate for antibody infusion. Pt overall well appearing, nontoxic. d/c home, return precautions given for SOB.

## 2021-03-10 LAB — SARS-COV-2 RNA SPEC QL NAA+PROBE: DETECTED

## 2022-04-12 NOTE — ED PROVIDER NOTE - PMH
Stress test is normal. If patient continues with symptoms will do nuclear or consider cath.    Diverticulitis    Vertigo

## 2022-09-15 NOTE — ED ADULT NURSE NOTE - SUICIDE SCREENING QUESTION 2
C/O abd. Pain with constipation. LBM Sunday. Irregular hear rate. Denies hx irregular heartbeat. Slow gait with use of cane, A&Ox3.    No

## 2023-06-28 PROBLEM — J45.909 UNSPECIFIED ASTHMA, UNCOMPLICATED: Chronic | Status: ACTIVE | Noted: 2021-03-09

## 2023-06-28 PROBLEM — Z00.00 ENCOUNTER FOR PREVENTIVE HEALTH EXAMINATION: Status: ACTIVE | Noted: 2023-06-28

## 2023-08-02 ENCOUNTER — APPOINTMENT (OUTPATIENT)
Dept: NEUROLOGY | Facility: CLINIC | Age: 73
End: 2023-08-02

## 2024-01-24 ENCOUNTER — NON-APPOINTMENT (OUTPATIENT)
Age: 74
End: 2024-01-24

## 2024-03-26 ENCOUNTER — APPOINTMENT (OUTPATIENT)
Dept: NEUROLOGY | Facility: CLINIC | Age: 74
End: 2024-03-26
Payer: COMMERCIAL

## 2024-03-26 VITALS
WEIGHT: 159 LBS | DIASTOLIC BLOOD PRESSURE: 76 MMHG | BODY MASS INDEX: 25.55 KG/M2 | SYSTOLIC BLOOD PRESSURE: 179 MMHG | HEART RATE: 80 BPM | HEIGHT: 66 IN

## 2024-03-26 PROCEDURE — 99214 OFFICE O/P EST MOD 30 MIN: CPT

## 2024-03-26 NOTE — ASSESSMENT
[FreeTextEntry1] : Impression is that of lumbar radiculopathy. Patient is being referred to pain management.    Entered by Cassandra Rainey acting as scribe for Dr. Dobbs.   The documentation recorded by the scribe, in my presence, accurately reflects the service I personally performed, and the decisions made by me with my edits as appropriate. Elijah Dobbs MD, FAAN, FACP Diplomate American Board of Psychiatry & Neurology.

## 2024-03-26 NOTE — PHYSICAL EXAM
[FreeTextEntry1] :  PHYSICAL EXAMINATION: Head: Normocephalic, atraumatic. Negative TA tenderness/prominence.  Neck: Supple with full range of motion; nontender with negative bilateral Spurling's signs.  Spine: Full range of motion; nontender. Positive straight leg raise maneuvers on the left at 45 degrees.   Extremities: Non-tender. Atraumatic. Negative Tinel's signs.  NEUROLOGICAL EXAMINATION:  Mental Status: In  accented English, patient is a good informant with intact orientation, attention, concentration, recent and remote memory. Language evaluation reveals no evidence of aphasia. Fund of knowledge is normal.  Cranial Nerves Cranial Nerves:  II, III, IV, VI: Pupils are equal, round, and reactive to light and accommodation. No evidence of afferent pupillary defect. Visual fields are full to confrontation. Eye movements are full without evidence of nystagmus or internuclear ophthalmoplegia. Funduscopic examination reveals sharp disc margins.  V: Normal jaw movements. Normal facial sensation.  VII: Normal facial motor testing.  VIII: Grossly normal hearing bilaterally.  IX, X: Palate moves symmetrically. No dysarthria.  XI: Normal shoulder shrug and sternocleidomastoid power.  XII: Tongue appears normal and protrudes in the midline.  Motor: Normal bulk, tone, and power throughout.  Muscle Stretch Reflexes (right/left): 2+ symmetrical.  Plantar Responses: Flexor bilaterally.  Coordination: Normal finger to nose and heel to shin testing, no truncal ataxia and no tremor.  Sensation: Reduced to absent in the left leg below the knee.   Gait and Station: Normal base, stride, and turning. Normal toe and heel walking. Normal tandem. Negative Romberg.

## 2024-04-29 ENCOUNTER — APPOINTMENT (OUTPATIENT)
Dept: PAIN MANAGEMENT | Facility: CLINIC | Age: 74
End: 2024-04-29
Payer: COMMERCIAL

## 2024-04-29 DIAGNOSIS — Z87.39 PERSONAL HISTORY OF OTHER DISEASES OF THE MUSCULOSKELETAL SYSTEM AND CONNECTIVE TISSUE: ICD-10-CM

## 2024-04-29 DIAGNOSIS — Z87.898 PERSONAL HISTORY OF OTHER SPECIFIED CONDITIONS: ICD-10-CM

## 2024-04-29 DIAGNOSIS — Z78.9 OTHER SPECIFIED HEALTH STATUS: ICD-10-CM

## 2024-04-29 DIAGNOSIS — Z87.19 PERSONAL HISTORY OF OTHER DISEASES OF THE DIGESTIVE SYSTEM: ICD-10-CM

## 2024-04-29 DIAGNOSIS — Z86.2 PERSONAL HISTORY OF DISEASES OF THE BLOOD AND BLOOD-FORMING ORGANS AND CERTAIN DISORDERS INVOLVING THE IMMUNE MECHANISM: ICD-10-CM

## 2024-04-29 DIAGNOSIS — Z87.09 PERSONAL HISTORY OF OTHER DISEASES OF THE RESPIRATORY SYSTEM: ICD-10-CM

## 2024-04-29 PROCEDURE — 99214 OFFICE O/P EST MOD 30 MIN: CPT

## 2024-04-29 RX ORDER — MELOXICAM 15 MG/1
15 TABLET ORAL DAILY
Qty: 30 | Refills: 0 | Status: ACTIVE | COMMUNITY
Start: 2024-04-29 | End: 1900-01-01

## 2024-04-29 RX ORDER — PREGABALIN 50 MG/1
50 CAPSULE ORAL
Qty: 60 | Refills: 0 | Status: ACTIVE | COMMUNITY
Start: 2024-04-29 | End: 1900-01-01

## 2024-04-29 RX ORDER — ALBUTEROL SULFATE 2.5 MG/3ML
(2.5 MG/3ML) SOLUTION RESPIRATORY (INHALATION)
Refills: 0 | Status: ACTIVE | COMMUNITY

## 2024-04-29 NOTE — HISTORY OF PRESENT ILLNESS
[FreeTextEntry1] : HISTORY OF PRESENT ILLNESS: Mrs. Cruzreyes is a 74-year-old female accompanied by her daughter who acts as  complaining of lower back pain with a radicular component to the left leg to the front of the ankle ongoing for about 3 years associated with numbness. She has trialed physical therapy with no relief. The pain started after no specific injury or event. The patient has had this pain for years.  Patient describes the pain as moderate to severe.  During the last month the pain has been nearly constant with symptoms worsening in no typical pattern. MRI of the lumbar spine in 2021 revealed L3-4 and L5-S1 disc herniations and L4-5-disc bulge. Patient was seen by Dr. Nicholas in the past and as of his last note in February 2022, he recommended a left MBB L3-S1.  ACTIVITIES:  Patient uses no assisted walking device at this time.  Patient has difficulty performing household chores, going to work, doing yardwork or shopping, participating in recreational activities & exercise at this time.   PRIOR PAIN TREATMENTS:  Moderate relief with 3left SNRI injections.  Prior Pain Medications: Medrol, Meloxicam, Advil, Motrin.

## 2024-04-29 NOTE — DISCUSSION/SUMMARY
[de-identified] : A discussion regarding available pain management treatment options occurred with the patient. These included interventional, rehabilitative, pharmacological, and alternative modalities. We will proceed with the following:   Interventional treatment options: 1. MRI lumbar spine w/o contrast was ordered to evaluate for anatomic changes of the lumbar discs, nerves, and surrounding tissue that will help provide information to accurately diagnose the patient's cause of pain and therefore treat said pain generator in the most effective way possible - whether that be specific physical therapy recommendations, medications, and/or interventional therapies.  2. X-ray of the BL knees ordered due to pain and decrease in range of motion in that area to delineate a pain generator.   Rehabilitative options: -Participation in active HEP was discussed and printed.   Medication based treatment options: - ordered a Medrol Dose Pack 4mg, use as directed for a duration of 6 days. - ordered Meloxicam 15mg daily for 4 weeks. Discussed risks and benefits. Avoid taking for any side effects. -Patient is aware to take for 2 weeks straight than take 1 week off before repeating. - ordered Lyrica 50mg BID of r a duration of 4 weeks.    Complementary treatment options: - Patient was advised to stay away from any heavy lifting. If needed, she was advised to squat and not bend forward. - Initiate physician directed activity and lifestyle modifications.  Follow up after imaging studies for further recommendations.  I, Forest Thacker, attest that this documentation has been prepared under the direction and in the presence of Provider Brennan Oglesby, DO The documentation recorded by the scribe, in my presence, accurately reflects the service I personally performed, and the decisions made by me with my edits as appropriate.   Best Regards, Brennan Oglesby D.O.

## 2024-05-28 ENCOUNTER — RESULT REVIEW (OUTPATIENT)
Age: 74
End: 2024-05-28

## 2024-05-28 ENCOUNTER — OUTPATIENT (OUTPATIENT)
Dept: OUTPATIENT SERVICES | Facility: HOSPITAL | Age: 74
LOS: 1 days | End: 2024-05-28
Payer: COMMERCIAL

## 2024-05-28 DIAGNOSIS — M25.561 PAIN IN RIGHT KNEE: ICD-10-CM

## 2024-05-28 PROCEDURE — 73562 X-RAY EXAM OF KNEE 3: CPT | Mod: 26,50

## 2024-05-28 PROCEDURE — 73562 X-RAY EXAM OF KNEE 3: CPT | Mod: 50

## 2024-05-29 DIAGNOSIS — M25.561 PAIN IN RIGHT KNEE: ICD-10-CM

## 2024-06-03 ENCOUNTER — APPOINTMENT (OUTPATIENT)
Dept: PAIN MANAGEMENT | Facility: CLINIC | Age: 74
End: 2024-06-03
Payer: COMMERCIAL

## 2024-06-03 DIAGNOSIS — M54.16 RADICULOPATHY, LUMBAR REGION: ICD-10-CM

## 2024-06-03 DIAGNOSIS — M25.561 PAIN IN RIGHT KNEE: ICD-10-CM

## 2024-06-03 DIAGNOSIS — M25.562 PAIN IN RIGHT KNEE: ICD-10-CM

## 2024-06-03 DIAGNOSIS — G89.29 PAIN IN RIGHT KNEE: ICD-10-CM

## 2024-06-03 PROCEDURE — 99214 OFFICE O/P EST MOD 30 MIN: CPT

## 2024-06-03 RX ORDER — METHYLPREDNISOLONE 4 MG/1
4 TABLET ORAL
Qty: 1 | Refills: 0 | Status: ACTIVE | COMMUNITY
Start: 2024-04-29 | End: 1900-01-01

## 2024-06-03 NOTE — DISCUSSION/SUMMARY
[de-identified] : A discussion regarding available pain management treatment options occurred with the patient. These included interventional, rehabilitative, pharmacological, and alternative modalities. We will proceed with the following:   Interventional treatment options: 1. MRI lumbar spine w/o contrast was ordered to evaluate for anatomic changes of the lumbar discs, nerves, and surrounding tissue that will help provide information to accurately diagnose the patient's cause of pain and therefore treat said pain generator in the most effective way possible - whether that be specific physical therapy recommendations, medications, and/or interventional therapies.  2. Patient will proceed with a L4-5 LESI. Treatment options were discussed with the patient. The patient has been having persistent lower back and lumbar radicular pain with minimal improvement with conservative therapies. Given that the patient has severe pain and failed conservative treatment, the patient was given the option to proceed with a lumbar epidural steroid injection to try to get some pain relief.        The risks and benefits were discussed which included bleeding, infection, nerve injury, no pain relief or worse, increased pain. All questions were answered, and concerns addressed.   Rehabilitative options: - c/w participation in active HEP as discussed and printed.   Medication based treatment options: - ordered a Medrol Dose Pack 4mg, use as directed for a duration of 6 days.  Complementary treatment options: - Patient was advised to stay away from any heavy lifting. If needed, she was advised to squat and not bend forward. - Physician directed activity and lifestyle modifications.  Follow up after imaging studies for further recommendations.  I, Forest Thacker, attest that this documentation has been prepared under the direction and in the presence of Provider Brennan Oglesby, DO The documentation recorded by the scribe, in my presence, accurately reflects the service I personally performed, and the decisions made by me with my edits as appropriate.   Best Regards, Brennan Oglesby D.O.

## 2024-06-03 NOTE — HISTORY OF PRESENT ILLNESS
[FreeTextEntry1] : HISTORY OF PRESENT ILLNESS: Mrs. Cruzreyes is a 74-year-old female accompanied by her daughter who acts as  complaining of lower back pain with a radicular component to the left leg to the front of the ankle ongoing for about 3 years associated with numbness. She has trialed physical therapy with no relief. The pain started after no specific injury or event. The patient has had this pain for years.  Patient describes the pain as moderate to severe.  During the last month the pain has been nearly constant with symptoms worsening in no typical pattern. MRI of the lumbar spine in 2021 revealed L3-4 and L5-S1 disc herniations and L4-5-disc bulge. Patient was seen by Dr. Nicholas in the past and as of his last note in February 2022, he recommended a left MBB L3-S1.  PRESENTING TODAY 06-: Patient presents to the office today for a follow up visit with continued low back pain. She notes she had an allergic reaction to the prescribed medication (Lyrica & Meloxicam) where she broke out in hives and went to the ER at [facility]. She continues with low back pain with a radicular component to the left leg to the front of the ankle. We are waiting for the results of her MRI of the lumbar spine. She also continues with BL knee pain; we reviewed the x-ray of her BL knees which showed a lot of arthritis. She rates her pain a 7 out of 10 on the pain scale today. Patient denies any bowel or bladder dysfunction, incontinence, or saddle anesthesia.

## 2024-06-03 NOTE — DATA REVIEWED
[FreeTextEntry1] : x-ray of the BL knee dated 05/28/24 suggests,  Right: No acute fracture or dislocation. Moderate tricompartmental osteoarthritis. Small joint effusion. Posterior knee joint body measures up to 1.5 cm.  Left: No acute fracture or dislocation. Severe patellofemoral and moderate medial and lateral compartment osteoarthritis. No significant joint effusion. Posterior knee joint bodies measuring up to 1.2 cm.

## 2024-06-14 ENCOUNTER — APPOINTMENT (OUTPATIENT)
Dept: PAIN MANAGEMENT | Facility: CLINIC | Age: 74
End: 2024-06-14

## 2024-07-28 ENCOUNTER — EMERGENCY (EMERGENCY)
Facility: HOSPITAL | Age: 74
LOS: 0 days | Discharge: HOME | End: 2024-07-28
Attending: EMERGENCY MEDICINE
Payer: MEDICARE

## 2024-07-28 VITALS
DIASTOLIC BLOOD PRESSURE: 82 MMHG | OXYGEN SATURATION: 100 % | HEIGHT: 64 IN | WEIGHT: 160.06 LBS | RESPIRATION RATE: 18 BRPM | TEMPERATURE: 98 F | HEART RATE: 62 BPM | SYSTOLIC BLOOD PRESSURE: 185 MMHG

## 2024-07-28 VITALS
HEART RATE: 58 BPM | SYSTOLIC BLOOD PRESSURE: 176 MMHG | TEMPERATURE: 98 F | OXYGEN SATURATION: 99 % | RESPIRATION RATE: 18 BRPM | DIASTOLIC BLOOD PRESSURE: 80 MMHG

## 2024-07-28 DIAGNOSIS — Z88.2 ALLERGY STATUS TO SULFONAMIDES: ICD-10-CM

## 2024-07-28 DIAGNOSIS — R10.9 UNSPECIFIED ABDOMINAL PAIN: ICD-10-CM

## 2024-07-28 DIAGNOSIS — J45.909 UNSPECIFIED ASTHMA, UNCOMPLICATED: ICD-10-CM

## 2024-07-28 DIAGNOSIS — K57.92 DIVERTICULITIS OF INTESTINE, PART UNSPECIFIED, WITHOUT PERFORATION OR ABSCESS WITHOUT BLEEDING: ICD-10-CM

## 2024-07-28 LAB
ALBUMIN SERPL ELPH-MCNC: 4.1 G/DL — SIGNIFICANT CHANGE UP (ref 3.5–5.2)
ALP SERPL-CCNC: 68 U/L — SIGNIFICANT CHANGE UP (ref 30–115)
ALT FLD-CCNC: 8 U/L — SIGNIFICANT CHANGE UP (ref 0–41)
ANION GAP SERPL CALC-SCNC: 13 MMOL/L — SIGNIFICANT CHANGE UP (ref 7–14)
APPEARANCE UR: CLEAR — SIGNIFICANT CHANGE UP
AST SERPL-CCNC: 15 U/L — SIGNIFICANT CHANGE UP (ref 0–41)
BASOPHILS # BLD AUTO: 0.05 K/UL — SIGNIFICANT CHANGE UP (ref 0–0.2)
BASOPHILS NFR BLD AUTO: 0.5 % — SIGNIFICANT CHANGE UP (ref 0–1)
BILIRUB SERPL-MCNC: <0.2 MG/DL — SIGNIFICANT CHANGE UP (ref 0.2–1.2)
BILIRUB UR-MCNC: NEGATIVE — SIGNIFICANT CHANGE UP
BUN SERPL-MCNC: 16 MG/DL — SIGNIFICANT CHANGE UP (ref 10–20)
CALCIUM SERPL-MCNC: 9.3 MG/DL — SIGNIFICANT CHANGE UP (ref 8.4–10.5)
CHLORIDE SERPL-SCNC: 103 MMOL/L — SIGNIFICANT CHANGE UP (ref 98–110)
CO2 SERPL-SCNC: 28 MMOL/L — SIGNIFICANT CHANGE UP (ref 17–32)
COLOR SPEC: YELLOW — SIGNIFICANT CHANGE UP
CREAT SERPL-MCNC: 0.8 MG/DL — SIGNIFICANT CHANGE UP (ref 0.7–1.5)
DIFF PNL FLD: NEGATIVE — SIGNIFICANT CHANGE UP
EGFR: 77 ML/MIN/1.73M2 — SIGNIFICANT CHANGE UP
EOSINOPHIL # BLD AUTO: 0.27 K/UL — SIGNIFICANT CHANGE UP (ref 0–0.7)
EOSINOPHIL NFR BLD AUTO: 2.9 % — SIGNIFICANT CHANGE UP (ref 0–8)
GLUCOSE SERPL-MCNC: 98 MG/DL — SIGNIFICANT CHANGE UP (ref 70–99)
GLUCOSE UR QL: NEGATIVE MG/DL — SIGNIFICANT CHANGE UP
HCT VFR BLD CALC: 35.2 % — LOW (ref 37–47)
HGB BLD-MCNC: 10.7 G/DL — LOW (ref 12–16)
IMM GRANULOCYTES NFR BLD AUTO: 0.2 % — SIGNIFICANT CHANGE UP (ref 0.1–0.3)
KETONES UR-MCNC: NEGATIVE MG/DL — SIGNIFICANT CHANGE UP
LEUKOCYTE ESTERASE UR-ACNC: ABNORMAL
LIDOCAIN IGE QN: 24 U/L — SIGNIFICANT CHANGE UP (ref 7–60)
LYMPHOCYTES # BLD AUTO: 3.45 K/UL — HIGH (ref 1.2–3.4)
LYMPHOCYTES # BLD AUTO: 37.6 % — SIGNIFICANT CHANGE UP (ref 20.5–51.1)
MCHC RBC-ENTMCNC: 22.6 PG — LOW (ref 27–31)
MCHC RBC-ENTMCNC: 30.4 G/DL — LOW (ref 32–37)
MCV RBC AUTO: 74.3 FL — LOW (ref 81–99)
MONOCYTES # BLD AUTO: 0.97 K/UL — HIGH (ref 0.1–0.6)
MONOCYTES NFR BLD AUTO: 10.6 % — HIGH (ref 1.7–9.3)
NEUTROPHILS # BLD AUTO: 4.41 K/UL — SIGNIFICANT CHANGE UP (ref 1.4–6.5)
NEUTROPHILS NFR BLD AUTO: 48.2 % — SIGNIFICANT CHANGE UP (ref 42.2–75.2)
NITRITE UR-MCNC: NEGATIVE — SIGNIFICANT CHANGE UP
NRBC # BLD: 0 /100 WBCS — SIGNIFICANT CHANGE UP (ref 0–0)
PH UR: 6 — SIGNIFICANT CHANGE UP (ref 5–8)
PLATELET # BLD AUTO: 336 K/UL — SIGNIFICANT CHANGE UP (ref 130–400)
PMV BLD: 10.4 FL — SIGNIFICANT CHANGE UP (ref 7.4–10.4)
POTASSIUM SERPL-MCNC: 4.2 MMOL/L — SIGNIFICANT CHANGE UP (ref 3.5–5)
POTASSIUM SERPL-SCNC: 4.2 MMOL/L — SIGNIFICANT CHANGE UP (ref 3.5–5)
PROT SERPL-MCNC: 7.9 G/DL — SIGNIFICANT CHANGE UP (ref 6–8)
PROT UR-MCNC: NEGATIVE MG/DL — SIGNIFICANT CHANGE UP
RBC # BLD: 4.74 M/UL — SIGNIFICANT CHANGE UP (ref 4.2–5.4)
RBC # FLD: 16.3 % — HIGH (ref 11.5–14.5)
SODIUM SERPL-SCNC: 144 MMOL/L — SIGNIFICANT CHANGE UP (ref 135–146)
SP GR SPEC: 1.01 — SIGNIFICANT CHANGE UP (ref 1–1.03)
UROBILINOGEN FLD QL: 0.2 MG/DL — SIGNIFICANT CHANGE UP (ref 0.2–1)
WBC # BLD: 9.17 K/UL — SIGNIFICANT CHANGE UP (ref 4.8–10.8)
WBC # FLD AUTO: 9.17 K/UL — SIGNIFICANT CHANGE UP (ref 4.8–10.8)

## 2024-07-28 PROCEDURE — 36415 COLL VENOUS BLD VENIPUNCTURE: CPT

## 2024-07-28 PROCEDURE — 96374 THER/PROPH/DIAG INJ IV PUSH: CPT | Mod: XU

## 2024-07-28 PROCEDURE — 74177 CT ABD & PELVIS W/CONTRAST: CPT | Mod: MC

## 2024-07-28 PROCEDURE — 74177 CT ABD & PELVIS W/CONTRAST: CPT | Mod: 26,MC

## 2024-07-28 PROCEDURE — 87086 URINE CULTURE/COLONY COUNT: CPT

## 2024-07-28 PROCEDURE — 81001 URINALYSIS AUTO W/SCOPE: CPT

## 2024-07-28 PROCEDURE — 85025 COMPLETE CBC W/AUTO DIFF WBC: CPT

## 2024-07-28 PROCEDURE — 99285 EMERGENCY DEPT VISIT HI MDM: CPT | Mod: GC

## 2024-07-28 PROCEDURE — 80053 COMPREHEN METABOLIC PANEL: CPT

## 2024-07-28 PROCEDURE — 99284 EMERGENCY DEPT VISIT MOD MDM: CPT | Mod: 25

## 2024-07-28 PROCEDURE — 83690 ASSAY OF LIPASE: CPT

## 2024-07-28 RX ORDER — CIPROFLOXACIN HCL 500 MG
1 TABLET ORAL
Qty: 20 | Refills: 0
Start: 2024-07-28 | End: 2024-08-06

## 2024-07-28 RX ORDER — METRONIDAZOLE 500 MG/1
1 TABLET ORAL
Qty: 30 | Refills: 0
Start: 2024-07-28 | End: 2024-08-06

## 2024-07-28 RX ORDER — KETOROLAC TROMETHAMINE 30 MG/ML
15 INJECTION, SOLUTION INTRAMUSCULAR ONCE
Refills: 0 | Status: DISCONTINUED | OUTPATIENT
Start: 2024-07-28 | End: 2024-07-28

## 2024-07-28 RX ORDER — SODIUM CHLORIDE 0.9 % (FLUSH) 0.9 %
500 SYRINGE (ML) INJECTION ONCE
Refills: 0 | Status: COMPLETED | OUTPATIENT
Start: 2024-07-28 | End: 2024-07-28

## 2024-07-28 RX ADMIN — KETOROLAC TROMETHAMINE 15 MILLIGRAM(S): 30 INJECTION, SOLUTION INTRAMUSCULAR at 08:15

## 2024-07-28 RX ADMIN — Medication 500 MILLILITER(S): at 08:57

## 2024-07-28 RX ADMIN — KETOROLAC TROMETHAMINE 15 MILLIGRAM(S): 30 INJECTION, SOLUTION INTRAMUSCULAR at 07:33

## 2024-07-28 NOTE — ED PROVIDER NOTE - PATIENT PORTAL LINK FT
You can access the FollowMyHealth Patient Portal offered by [facility] by registering at the following website: [email or web address]. By joining [employer]’s FollowMyHealth portal, you will also be able to view your health information using other applications (apps) compatible with our system.

## 2024-07-28 NOTE — ED ADULT NURSE NOTE - OBJECTIVE STATEMENT
pt reports having abd pain x 5 days, that radiates to the back, pt reports having urgency when she pees and states "it feels hot" pt also has hx colitis

## 2024-07-28 NOTE — ED PROVIDER NOTE - PROGRESS NOTE DETAILS
KATHLEEN: Patient reevaluated.  Feels better after medications, nontender on abdominal examination.  Discussed results of laboratory work, CT scan, and urine, patient has acute diverticulitis.  Will discharge home on outpatient Cipro and Flagyl.  Advised for over-the-counter pain control with Tylenol and ibuprofen.  Will have patient follow-up with her primary care doctor.  Given return precautions including inability tolerate any p.o., spiking fevers, and worsening abdominal pain.

## 2024-07-28 NOTE — ED ADULT TRIAGE NOTE - CHIEF COMPLAINT QUOTE
I got the pain for five days and the pain in back. I go pee pee a lot and it's irritated. And I got the headache. I got the colitis   - patient

## 2024-07-28 NOTE — ED PROVIDER NOTE - CLINICAL SUMMARY MEDICAL DECISION MAKING FREE TEXT BOX
[Joint Pain] : joint pain [Joint Swelling] : joint swelling [Negative] : Heme/Lymph Declines , uses herself and . 74-year-old female history of diverticulitis, asthma, vertigo, presents with left lower quadrant abdominal pain radiating to the back x 5 days, associated urinary frequency and dysuria. States similar to past colitis and UTI with which she was diagnosed 1 month ago, though did not fully finish the abx (instructed to finish this this time). On exam, afebrile, hemodynamically stable, saturating well on room air, NAD, well appearing, sitting comfortably in bed, no WOB, speaking full sentences, head NCAT, EOMI grossly, anicteric, MMM, breathing comfortably on room air, abd soft, left lower quadrant TTP, ND, nml BS, no rebound or guarding, AAO, CN's 3-12 grossly intact, RIVERA spontaneously, no leg cyanosis or edema, skin warm, well perfused, no rashes or hives. Denies all other symptom including vomiting, fever, black or bloody stool, vaginal discharge. Character, exam, appearance low suspicion for mesenteric ischemia or dissection to warrant CT angio imaging. Character low suspicion for ovarian torsion to warrant ultrasound imaging. Abdomen nonperitoneal. Exam and hx c/w diverticulitis, confirmed on CT. Patient is well appearing, NAD, afebrile, hemodynamically stable. Any available tests and studies were discussed with patient and . Discharged with cipro/flagyl, instructions in further symptomatic care, return precautions, and need for f/u.

## 2024-07-28 NOTE — ED PROVIDER NOTE - NSFOLLOWUPINSTRUCTIONS_ED_ALL_ED_FT
Diverticulitis  Diverticulitis  Body outline showing the stomach and intestines, with a close-up of diverticula on the large intestine.  La diverticulitis ocurre cuando pequeñas bolsas en el colon se infectan o se inflaman. [place] ocasiona dolor en el vientre (abdomen) y heces acuosas (diarrea).    Las pequeñas bolsas se denominan divertículos. Se pueden formar en las personas que tienen mckenzie afección llamada diverticulosis.    ¿Cuáles son las causas?  Puede desarrollar esta afección si queda materia fecal (heces) atrapada en las bolsas del colon. La materia fecal permite el crecimiento de gérmenes (bacterias). [place] causa mckenzie infección.    ¿Qué incrementa el riesgo?  Es más probable que contraiga esta afección si tiene pequeñas bolsas en el colon. También es más probable que la contraiga si:  Tiene sobrepeso o mucho sobrepeso (es olvin).  No realiza actividad física suficiente.  [place] alcohol.  Fuma.  Come mucha carne kristen, aminata carne de flex, cerdo o brandt.  No consume suficiente fibra.  Es mayor de 40 años.  ¿Cuáles son los signos o síntomas?  Dolor en el vientre. El dolor suele aparecer en el lado neda, renae puede manifestarse también en otras zonas.  Fiebre y escalofríos.  Ganas de vomitar.  Vómitos.  Cólicos.  Sensación de estar lleno.  Cambios en la frecuencia de las deposiciones.  Pedro en las heces.  ¿Cómo se trata?  La mayoría de los casos se tratan en casa. Es posible que le indiquen lo siguiente:  Madie analgésicos de venta marco.  Beber solamente líquidos celia.  Madie antibióticos.  [place].  Es posible que los casos muy graves deban tratarse en el hospital. El tratamiento puede incluir:  No comer ni beber nada.  Madie analgésicos.  Recibir antibióticos por vía intravenosa.  Recibir líquidos y alimentos a través de mckenzie vía intravenosa.  Someterse a mckenzie cirugía.  Cuando se sienta mejor, el médico puede indicarle que se someta a mckenzie prueba para examinar el colon (colonoscopía).    Siga estas instrucciones en sims casa:  Medicamentos    Use los medicamentos de venta marco y los recetados solamente aminata se lo haya indicado el médico. [place] incluye lo siguiente:  Pastillas de fibra.  Probióticos.  Medicamentos para ablandar las heces (laxantes).  Si le recetaron antibióticos, tómelos aminata se lo haya indicado el médico. No deje de tomarlos aunque comience a sentirse mejor.  Pregunte al médico si debe evitar conducir o utilizar máquinas mientras jeremy los medicamentos.  Comida y bebida    Pear, berries, artichoke, and beans.  Siga la dieta aminata se lo haya indicado el médico. Es posible que solo deba comer y beber líquidos.  Cuando se sienta mejor, es posible que pueda comer más alimentos. [place] también le indiquen que coma mucha fibra. La fibra le ayuda a defecar. Los alimentos con fibra incluyen bayas, frijoles, lentejas y verduras verdes.  Trate de no comer carne kristen.  Instrucciones generales    No fume ni consuma ningún producto que contenga nicotina o tabaco. Si necesita ayuda para dejar de fumar, consulte al médico.  Supa ejercicio 3 o más veces por semana. Trate de hacer 30 minutos cada vez. Ejercítese lo suficiente aminata para transpirar y acelerar los latidos cardíacos.  Comuníquese con un médico si:  El dolor empeora.  No defeca aminata lo hace normalmente.  Los síntomas no mejoran.  Los síntomas empeoran con gran rapidez.  Tiene fiebre.  Vomita más de mckenzie vez.  [place] heces tienen las siguientes características:  Contienen pedro.  Son de color rkystal.  Son alquitranadas.  Esta información no tiene aminata fin reemplazar el consejo del médico. Asegúrese de hacerle al médico cualquier pregunta que tenga.

## 2024-07-28 NOTE — ED PROVIDER NOTE - ATTENDING CONTRIBUTION TO CARE
Declines , uses herself and . 74-year-old female history of diverticulitis, asthma, vertigo, presents with left lower quadrant abdominal pain radiating to the back x 5 days, associated urinary frequency and dysuria. States similar to past colitis and UTI with which she was diagnosed 1 month ago. On exam, afebrile, hemodynamically stable, saturating well on room air, NAD, well appearing, sitting comfortably in bed, no WOB, speaking full sentences, head NCAT, EOMI grossly, anicteric, MMM, breathing comfortably on room air, abd soft, left lower quadrant TTP, ND, nml BS, no rebound or guarding, AAO, CN's 3-12 grossly intact, RIVERA spontaneously, no leg cyanosis or edema, skin warm, well perfused, no rashes or hives. Denies all other symptom including vomiting, fever, black or bloody stool, vaginal discharge. Character, exam, appearance low suspicion for mesenteric ischemia or dissection to warrant CT angio imaging. Character low suspicion for ovarian torsion to warrant ultrasound imaging. Abdomen nonperitoneal Declines , uses herself and . 74-year-old female history of diverticulitis, asthma, vertigo, presents with left lower quadrant abdominal pain radiating to the back x 5 days, associated urinary frequency and dysuria. States similar to past colitis and UTI with which she was diagnosed 1 month ago, though did not fully finish the abx (instructed to finish this this time). On exam, afebrile, hemodynamically stable, saturating well on room air, NAD, well appearing, sitting comfortably in bed, no WOB, speaking full sentences, head NCAT, EOMI grossly, anicteric, MMM, breathing comfortably on room air, abd soft, left lower quadrant TTP, ND, nml BS, no rebound or guarding, AAO, CN's 3-12 grossly intact, RIVERA spontaneously, no leg cyanosis or edema, skin warm, well perfused, no rashes or hives. Denies all other symptom including vomiting, fever, black or bloody stool, vaginal discharge. Character, exam, appearance low suspicion for mesenteric ischemia or dissection to warrant CT angio imaging. Character low suspicion for ovarian torsion to warrant ultrasound imaging. Abdomen nonperitoneal. Exam and hx c/w diverticulitis, confirmed on CT. Patient is well appearing, NAD, afebrile, hemodynamically stable. Any available tests and studies were discussed with patient and . Discharged with cipro/flagyl, instructions in further symptomatic care, return precautions, and need for f/u.

## 2024-07-28 NOTE — ED PROVIDER NOTE - PHYSICAL EXAMINATION
VITAL SIGNS: I have reviewed nursing notes and confirm.  CONSTITUTIONAL: Well-developed; well-nourished; in no acute distress.  SKIN: Skin exam is warm and dry, no acute rash.  HEAD: Normocephalic; atraumatic.  EYES: PERRL, EOM intact; conjunctiva and sclera clear.  ENT: airway clear.   NECK: Supple  CARD: S1, S2 normal; no murmurs, gallops, or rubs. Regular rate and rhythm.  RESP: No wheezes, rales or rhonchi.  ABD: Normal bowel sounds; soft; non-distended; tenderness LLQ, left CVA tenderness  EXT: Normal ROM.   NEURO: Alert, oriented.   PSYCH: Cooperative, appropriate.

## 2024-07-28 NOTE — ED PROVIDER NOTE - OBJECTIVE STATEMENT
74-year-old female with history of asthma, diverticulitis, vertigo presents ED with complaints of left-sided abdominal pain.  Patient states that the pain started 5 days ago radiates into her left low back.  Denies any fevers, nausea, vomiting, constipation, or diarrhea.  Reports associated dysuria and urinary frequency.  No hematuria.  Patient admits to a recent bout of colitis and UTI about 1 month ago, was treated with antibiotics and states that her pain improved, but never really went away.  Still tolerating p.o.

## 2024-09-03 ENCOUNTER — NON-APPOINTMENT (OUTPATIENT)
Age: 74
End: 2024-09-03